# Patient Record
Sex: FEMALE | Race: WHITE | HISPANIC OR LATINO | Employment: PART TIME | URBAN - METROPOLITAN AREA
[De-identification: names, ages, dates, MRNs, and addresses within clinical notes are randomized per-mention and may not be internally consistent; named-entity substitution may affect disease eponyms.]

---

## 2017-02-06 PROBLEM — E55.9 VITAMIN D DEFICIENCY DISEASE: Status: ACTIVE | Noted: 2017-02-06

## 2017-02-20 PROBLEM — M79.18 MYOFACIAL MUSCLE PAIN: Status: ACTIVE | Noted: 2017-02-20

## 2017-03-31 PROBLEM — D64.9 ANEMIA: Status: ACTIVE | Noted: 2017-03-31

## 2017-03-31 PROBLEM — E83.51 HYPOCALCEMIA: Status: ACTIVE | Noted: 2017-03-31

## 2017-08-30 PROBLEM — E53.8 VITAMIN B 12 DEFICIENCY: Status: ACTIVE | Noted: 2017-08-30

## 2017-08-30 PROBLEM — I27.20 PULMONARY HYPERTENSION (HCC): Status: ACTIVE | Noted: 2017-08-30

## 2017-08-30 PROBLEM — G47.33 OSA (OBSTRUCTIVE SLEEP APNEA): Status: ACTIVE | Noted: 2017-08-30

## 2017-08-30 PROBLEM — D64.9 ANEMIA: Status: RESOLVED | Noted: 2017-03-31 | Resolved: 2017-08-30

## 2017-08-30 PROBLEM — Z79.01 ANTICOAGULATED: Status: ACTIVE | Noted: 2017-08-30

## 2017-10-20 PROBLEM — E27.49 ADRENAL HEMORRHAGE (HCC): Status: ACTIVE | Noted: 2017-10-20

## 2017-12-12 PROBLEM — Z86.711 HISTORY OF PULMONARY EMBOLISM: Status: ACTIVE | Noted: 2017-12-12

## 2018-02-27 PROBLEM — F11.90 CHRONIC NARCOTIC USE: Status: ACTIVE | Noted: 2018-02-27

## 2018-02-27 PROBLEM — R32 URINARY INCONTINENCE: Status: ACTIVE | Noted: 2018-02-27

## 2018-03-05 PROBLEM — Z91.148 CONTROLLED SUBSTANCE AGREEMENT BROKEN: Status: ACTIVE | Noted: 2018-03-05

## 2018-06-28 PROBLEM — N39.41 URGE INCONTINENCE OF URINE: Status: ACTIVE | Noted: 2018-06-28

## 2018-06-28 PROBLEM — R32 URINARY INCONTINENCE: Status: RESOLVED | Noted: 2018-02-27 | Resolved: 2018-06-28

## 2018-07-24 PROBLEM — D50.8 IRON DEFICIENCY ANEMIA SECONDARY TO INADEQUATE DIETARY IRON INTAKE: Status: ACTIVE | Noted: 2018-07-24

## 2018-09-11 PROBLEM — F33.1 MODERATE EPISODE OF RECURRENT MAJOR DEPRESSIVE DISORDER (HCC): Status: ACTIVE | Noted: 2018-09-11

## 2019-04-17 PROBLEM — D50.8 IRON DEFICIENCY ANEMIA SECONDARY TO INADEQUATE DIETARY IRON INTAKE: Status: RESOLVED | Noted: 2018-07-24 | Resolved: 2019-04-17

## 2019-07-16 PROBLEM — N39.41 URGENCY INCONTINENCE: Status: ACTIVE | Noted: 2019-07-16

## 2019-07-16 PROBLEM — R35.0 URINARY FREQUENCY: Status: ACTIVE | Noted: 2019-07-16

## 2020-02-12 PROBLEM — Z87.440 HISTORY OF RECURRENT UTIS: Status: ACTIVE | Noted: 2020-02-12

## 2020-06-04 PROBLEM — K59.00 CONSTIPATION: Status: ACTIVE | Noted: 2020-06-04

## 2020-06-04 PROBLEM — R10.13 EPIGASTRIC ABDOMINAL PAIN: Status: ACTIVE | Noted: 2020-06-04

## 2020-10-14 PROBLEM — Z79.01 ANTICOAGULATED: Status: RESOLVED | Noted: 2017-08-30 | Resolved: 2020-10-14

## 2020-10-14 PROBLEM — Z86.711 HISTORY OF PULMONARY EMBOLISM: Status: RESOLVED | Noted: 2017-12-12 | Resolved: 2020-10-14

## 2020-11-02 PROBLEM — R30.0 DYSURIA: Status: ACTIVE | Noted: 2020-11-02

## 2021-12-13 PROBLEM — K43.9 VENTRAL HERNIA WITHOUT OBSTRUCTION OR GANGRENE: Status: ACTIVE | Noted: 2021-12-13

## 2021-12-13 PROBLEM — J38.5 LARYNGOSPASM: Status: ACTIVE | Noted: 2021-12-13

## 2021-12-13 PROBLEM — E87.6 HYPOKALEMIA: Status: ACTIVE | Noted: 2021-12-13

## 2021-12-13 PROBLEM — J81.0 ACUTE PULMONARY EDEMA (HCC): Status: ACTIVE | Noted: 2021-12-13

## 2021-12-13 PROBLEM — R74.01 TRANSAMINITIS: Status: ACTIVE | Noted: 2021-12-13

## 2023-12-28 PROBLEM — R30.0 DYSURIA: Status: RESOLVED | Noted: 2020-11-02 | Resolved: 2023-12-28

## 2024-03-22 ENCOUNTER — APPOINTMENT (OUTPATIENT)
Dept: GYNECOLOGY | Facility: CLINIC | Age: 69
End: 2024-03-22

## 2024-06-10 ENCOUNTER — APPOINTMENT (OUTPATIENT)
Dept: GYNECOLOGY | Facility: CLINIC | Age: 69
End: 2024-06-10

## 2024-06-10 VITALS
DIASTOLIC BLOOD PRESSURE: 70 MMHG | BODY MASS INDEX: 39.85 KG/M2 | HEART RATE: 89 BPM | WEIGHT: 203 LBS | SYSTOLIC BLOOD PRESSURE: 114 MMHG | HEIGHT: 60 IN

## 2024-06-10 DIAGNOSIS — N95.8 GENITOURINARY SYNDROME OF MENOPAUSE: ICD-10-CM

## 2024-06-10 DIAGNOSIS — N39.46 URINARY INCONTINENCE, MIXED: Primary | ICD-10-CM

## 2024-06-10 DIAGNOSIS — R35.1 NOCTURIA: ICD-10-CM

## 2024-06-10 DIAGNOSIS — N39.41 URGE INCONTINENCE: ICD-10-CM

## 2024-06-10 DIAGNOSIS — N39.0 RECURRENT URINARY TRACT INFECTION: ICD-10-CM

## 2024-06-10 DIAGNOSIS — G47.33 OSA (OBSTRUCTIVE SLEEP APNEA): ICD-10-CM

## 2024-06-10 LAB
APPEARANCE UR: NORMAL
BILIRUB UR STRIP-MCNC: NEGATIVE MG/DL
COLOR UR AUTO: NORMAL
GLUCOSE UR STRIP.AUTO-MCNC: NEGATIVE MG/DL
KETONES UR STRIP.AUTO-MCNC: NORMAL MG/DL
LEUKOCYTE ESTERASE UR QL STRIP.AUTO: NORMAL
NITRITE UR QL STRIP.AUTO: NEGATIVE
PH UR STRIP.AUTO: 6 [PH] (ref 5–8)
PROT UR QL STRIP: NORMAL MG/DL
RBC UR QL AUTO: NORMAL
SP GR UR STRIP.AUTO: >=1.03
UROBILINOGEN UR STRIP-MCNC: NORMAL MG/DL

## 2024-06-10 PROCEDURE — 99204 OFFICE O/P NEW MOD 45 MIN: CPT | Performed by: STUDENT IN AN ORGANIZED HEALTH CARE EDUCATION/TRAINING PROGRAM

## 2024-06-10 PROCEDURE — 3074F SYST BP LT 130 MM HG: CPT | Performed by: STUDENT IN AN ORGANIZED HEALTH CARE EDUCATION/TRAINING PROGRAM

## 2024-06-10 PROCEDURE — 3078F DIAST BP <80 MM HG: CPT | Performed by: STUDENT IN AN ORGANIZED HEALTH CARE EDUCATION/TRAINING PROGRAM

## 2024-06-10 PROCEDURE — 81002 URINALYSIS NONAUTO W/O SCOPE: CPT | Performed by: STUDENT IN AN ORGANIZED HEALTH CARE EDUCATION/TRAINING PROGRAM

## 2024-06-10 RX ORDER — ESTRADIOL 0.1 MG/G
CREAM VAGINAL
Qty: 1 EACH | Refills: 6 | Status: SHIPPED | OUTPATIENT
Start: 2024-06-10

## 2024-06-10 ASSESSMENT — FIBROSIS 4 INDEX: FIB4 SCORE: 1.41

## 2024-06-10 NOTE — PROGRESS NOTES
Urogynecology & Reconstructive Pelvic Surgery - Consultation Visit    Mariann He MRN:[id number] :1955    Referred by: Teri Henriquez MD ([facility])    Reason for Visit:   Chief Complaint   Patient presents with    New Patient     Consult          Subjective     History of Presenting Illness:    Mariann He is a 69 y.o. who presents for the evaluation and management of urinary incontinence and recurrent UTIs.     She has significant urge predominant urinary incontinence, leaking on the way to the bathroom, and also waking every hour, and has already tried trospium, oxybutynin, Myrbetriq, Gemtesa.  The latter 2 were too expensive even through [place] pharmacies.  She was referred to discuss possible third line options including neuromodulation    She has had frequent UTIs recently, 3 in the last few months.  She has not yet tried vaginal estrogen    Her past history is notable for BMI 39, significant back issues, and walks with a cane.  She reports that she is not a candidate for any surgery    Prior Pelvic surgery:   Hysterectomy  Cholecystectomy  Ventral hernia repair with mesh     Prior treatment:   Trospium - current  Oxybutynin  Mirabegron  Vibegron.     Pelvic floor symptom review:     Bladder:   Voids per day: 10 Voids per night: 6      Urinary incontinence episodes per day: 1    Urge leakage:  On Movement to Bathroom and Full Bladder   Stress leakage: occasionally with exercise   Continuous / insensible urine loss: No    Nocturnal enuresis: No    Leakage volume: Moderate to large soakin gepisodes   Bladder emptying: Complete   Voiding symptoms: None   UTI in last 12 months: yes   Other urologic history: none      Prolapse:     Prolapse symptoms: None        Past medical and surgical history      Past medical history:  Past Medical History:   Diagnosis Date    Laryngospasm 2021    Cataract 2021    surgery both eyes    Moderate episode of recurrent major depressive disorder (HCC)  09/11/2018    Iron deficiency anemia secondary to inadequate dietary iron intake 07/24/2018    Iron deficiency anemia secondary to inadequate dietary iron intake 07/24/2018    Urinary incontinence 02/27/2018    Chronic narcotic use RX BFM OP 2/18 02/27/2018    History of pulmonary embolism 12/12/2017    Vitamin B 12 deficiency 08/30/2017    Anticoagulated 08/30/2017    Pulmonary hypertension (HCC) 08/30/2017    FLORENTIN (obstructive sleep apnea) 08/30/2017    Anticoagulated 3.0-3.5 08/30/2017    Hypocalcemia 03/31/2017    Anemia 03/31/2017    Myofacial muscle pain 02/20/2017    Vitamin D deficiency disease 02/06/2017    Chronic back pain 06/03/2016    Lupus anticoagulant positive 02/20/2016    Anxiety 02/12/2016    PE (pulmonary thromboembolism) ([facility]) 02/12/2016    Bronchitis 2016    Pneumonia 2016    Colonoscopy refused 12/16/2015    Leucopenia 10/26/2015    WBC 3.1 (10/26/2015)    Dyslipidemia 10/26/2015    , TC/HDL 2.10 (10/26/2015)    Hypertriglyceridemia 10/26/2015     (10/26/2015)    Hypovitaminosis D 10/26/2015    vit D 15 (12/14/2015)    COPD exacerbation ([facility]) 10/06/2015    Cough 10/06/2015    Morbid obesity with BMI of 50.0-59.9, adult ([facility]) 02/09/2015    Elevated alkaline phosphatase level 07/2014    alk phos 117 (10/26/2015)    Hypoalbuminemia 07/2014    serum alb 3.2 (10/26/2015)    H/O: pneumonia 12/2012    Allergy     Anesthesia     PONV    Antiphospholipid antibody syndrome ([facility])     Arthritis     back    Asthma     Bladder spasm     Bowel habit changes     chronic constipation    Diabetes ([facility])     prediabetes per pt    Esophageal reflux     Fibromyalgia     Former tobacco use     quit 1974    H/O blood clots     Headache     Headache(784.0)     Heart burn     Hot flashes     Hypocalcemia     Calcium 8.4 (8.5-10.1) 10/26/2015    Hypothyroidism     TSH 0.67 (12/14/2015)    IBD (inflammatory bowel disease)     Indigestion     Myalgia     Neuropathy ([facility])     PONV (postoperative nausea and  "vomiting)     Renal disorder     kidney stones \"a long time ago\"    Stress incontinence     Ulcer     Urinary tract infection     Urinary urgency      Past surgical history:  Past Surgical History:   Procedure Laterality Date    VENTRAL HERNIA REPAIR ROBOTIC XI N/A 12/13/2021    Procedure: Robotic,ventral hernia repair x2 with mesh, reduction of incarcerated bladder from periumbilical ventral hernia.;  Surgeon: Trinidad Light M.D.;  Location: [facility];  Service: General    MO COLONOSCOPY,BIOPSY  9/30/2020    Procedure: COLONOSCOPY, WITH BIOPSY;  Surgeon: David Starks M.D.;  Location: [facility];  Service: Gastroenterology    MO UPPER GI ENDOSCOPY,BIOPSY  9/30/2020    Procedure: GASTROSCOPY, WITH BIOPSY;  Surgeon: David Starks M.D.;  Location: [facility];  Service: Gastroenterology    MO CYSTOURETHROSCOPY,BIOPSIES  6/8/2020    Procedure: CYSTOSCOPY, WITH BLADDER BIOPSY [57.33];  Surgeon: Fausto Buchanan M.D.;  Location: SUR[facility]ERY [facility];  Service: Urology    BLOCK EPIDURAL STEROID INJECTION  6/20/2017    Procedure: LUMBAR EPIDURAL STEROID INJECTION UNDER FLUOROSCOPY WITH PROCEDURAL SEDATION;  Surgeon: Kevin Booth M.D.;  Location: SUR[facility]ERY [facility];  Service:     APPENDECTOMY  1997    HYSTERECTOMY, TOTAL ABDOMINAL  1997    ABDOMINAL HYSTERECTOMY TOTAL      CHOLECYSTECTOMY      Open    GASTRIC BYPASS LAPAROSCOPIC      HERNIA REPAIR       Medications:has a current medication list which includes the following prescription(s): estradiol, linaclotide, warfarin, hydrocodone-acetaminophen, levothyroxine, cyanocobalamin, vitamin d2 (ergocalciferol), trospium, omeprazole, gabapentin, NON SPECIFIED, naloxone, docusate sodium, and albuterol, and the following Facility-Administered Medications: cyanocobalamin.  Allergies:Codeine, Dilaudid [hydromorphone hcl], Pcn [penicillins], Sulfa drugs, Asa [aspirin], Ciprofloxacin, Ibuprofen, and Vesicare [solifenacin]  Family history:  Family " History   Problem Relation Age of Onset    Heart Disease Mother     Other Mother         Kidney Disease    Heart Attack Father 74    Diabetes Father     Hypertension Father     Stroke Father 73    Other Sister         unknown causes    Diabetes Sister     Heart Disease Brother         Heart bypass x 4    Diabetes Brother     Diabetes Brother     Cancer Brother 69        Rectal     Social history: reports that she quit smoking about 60 years ago. Her smoking use included cigarettes. She has never used smokeless tobacco. She reports current alcohol use. She reports current drug use. Drug: Oral.    Review of systems: A full review of systems was performed, and negative with the exception of want is noted above in the HPI.        Objective        /70 (BP Location: Left arm, Patient Position: Sitting, BP Cuff Size: Large adult)   Pulse 89   Ht 5'   Wt 203 lb   BMI 39.65 kg/m²     Physical Exam  Vitals reviewed. Exam conducted with a chaperone present.   Constitutional:       Appearance: Normal appearance. She is obese.   HENT:      Head: Normocephalic.      Mouth/Throat:      Mouth: Mucous membranes are moist.   Cardiovascular:      Rate and Rhythm: Normal rate.   Pulmonary:      Effort: Pulmonary effort is normal.   Musculoskeletal:      Comments: Uses cane   Skin:     General: Skin is warm and dry.   Neurological:      Mental Status: She is alert.   Psychiatric:         Mood and Affect: Mood normal.         Procedure Performed: No    Diagnostic test and records review:      Post-void residual: 0 mL, performed by Bladder Scanner    Labs: n/a    Radiology: n/a    Procedural: n/a    Documentation reviewed: Prior EMR Records         Assessment & Plan     Mariann He is a 69 y.o. with urge predominant mixed urinary incontinence. We discussed my recommendations for further diagnosis and treatment at length today.     1. Urinary incontinence, mixed, urge predominant with nocturia  She has overactive bladder and  urgency incontinence which has not responded significantly to behavioral therapy, or to oral medications including multiple anticholinergics and beta 3 agonists. She qualifies for third line therapies, and she was counseled on her options, which include:   Percutaneous tibial nerve stimulation (PTNS) - noninvasive and minimal risk therapy which involves nerve stimulation using an acupuncture-type needle in the ankle with non-painful nerve stimulation. This involves 12 weekly 30-minute treatment sessions, then maintenance treatments as needed, usually every 1-2 months. Symptom improvement is seen in 60-80% of patients.  This is not feasible given how far she lives with in the clinic, and given her large leakage episodes would be less likely to her symptoms   sacral neuromodulation (SNM) - this involves a “test phase” with temporary lead implantation, followed by a full permanent implant of lad and batter if therapy results in >50% improvement in symptoms. Therapy is successful in reducing OAB symptoms in approximately 70% of patients.  While this could be an excellent therapy for her given her significant back problems, this may limit its effectiveness.  Neuromodulation has been shown to be effective in those with significant back pain, although I have found this often limits success rates.  Bladder chemodenervation with Botox injection - this is an office/outpatient procedure involving a cystoscopy (camera in bladder) and injection of onabotulinumtoxinA. Botox has higher rates of complete symptom resolution compared to SNM (70-80%), but may result in temporary urinary retention requiring intermittent catheterization (6-12%). The effects of botox usually last between 6-12 months, and if successful, repeat injections are necessary.   She would like to move forward with a trial of cystourethroscopy and chemodenervation with onabotulinum toxin A, 100 units  Referral to Physical Therapy also placed ([facility])  She  was counseled thoroughly on the link between obstructive sleep apnea and nocturia.  Given that she has nocturnal frequency up to 6 times per night, it is more likely that her FLORENTIN is the cause of her nighttime waking episodes then only the bladder.  She is already getting oxygen treatment, since she cannot tolerate CPAP.  I recommend trying to optimize her FLORENTIN in order to decrease her nighttime waking episodes and also for her overall health.    2. Recurrent urinary tract infection  She is counseled on the etiology of recurrent urinary tract infection.  In women, most uropathogens originate in the rectal jeni, colonize the periurethral area and urethra, and ascend to the bladder. Alteration of the normal vaginal jeni may predispose women to vaginal colonization with bacteria and to UTI. Risk factors include menopause, urinary incontinence, sexual intercourse, and prior history of UTIs.   Counseled that bacteria in the urine without symptoms does not require treatment - conversely symptoms without bacteria on culture do not require antibiotics and further workup is required.  Overuse of antibiotics when not necessary may contribute to harming natural defense mechanisms within the body.  The importance of obtaining urine cultures with each bout of symptoms was emphasized.  Due to living so far from our offices, she will need to follow these up with her primary care and report back if she has any worsening of her symptoms.  She will be unable to go to [facility]  Vaginal estrogen replacement in postmenopausal women is one of the most effective and safest therapies in preventing recurrent urinary tract infection.  This works by helping improve periurethral tissue quality and optimizing the vaginal microbiome to prevent colonization by harmful gut jeni.  Estrogen therapy is safe, not significantly absorbed by the bloodstream, and may take weeks to months to start to see effects  If no improvement with the above  therapy, I would recommend cystourethroscopy to fully evaluate the bladder urothelium to rule out structural causes for her symptoms.    3. Genitourinary syndrome of menopause  She has vaginal atrophy / genitorurinary syndrome of menopause. This is very common and due to low estrogen levels, which render the vaginal tissue thin, irritated, and open to colonization with gut jeni. This can lead to irritation, dryness, painful sex, urinary infections and urinary urgency and incontinence. Discussed risks, benefits, and indications for vaginal estrogen therapy.  Vaginal estrogen is considered a topical-only therapy that has negligible absorption into the bloodstream and is not associated with increased risks for uterine or breast cancers, stroke, blood clots. The effects of vaginal estrogen can take weeks to months.  Prescription given for:   - estradiol (ESTRACE VAGINAL) 0.1 MG/GM vaginal cream; Apply 1g cream inside vagina using applicator nightly for 2 weeks, then twice per week thereafter. For refills, continue to take twice per week.  Dispense: 1 Each; Refill: 6      Talk to PCP about FLORENTIN            Tal Arita MD, FACOG  Urogynecology and Reconstructive Pelvic Surgery  Department of Obstetrics and Gynecology  [facility]        This medical record contains text that has been entered with the assistance of computer voice recognition and dictation software.  Therefore, it may contain unintended errors in text, spelling, punctuation, or grammar

## 2024-06-10 NOTE — PROGRESS NOTES
PT here today for consult   Ref for Urge Incontinence   Hysterectomy? 1997  Good #:  [phone]   PVR : 0 mL  Pharmacy Verified

## 2024-07-25 ENCOUNTER — TELEPHONE (OUTPATIENT)
Dept: OBGYN | Facility: CLINIC | Age: 69
End: 2024-07-25
Payer: MEDICARE

## 2024-07-26 ENCOUNTER — TELEPHONE (OUTPATIENT)
Dept: OBGYN | Facility: CLINIC | Age: 69
End: 2024-07-26

## 2024-07-26 ENCOUNTER — APPOINTMENT (OUTPATIENT)
Dept: GYNECOLOGY | Facility: CLINIC | Age: 69
End: 2024-07-26
Payer: MEDICARE

## 2024-08-20 PROBLEM — R39.89 BLADDER PAIN: Status: ACTIVE | Noted: 2024-08-20

## 2024-08-20 PROBLEM — N32.81 OVERACTIVE BLADDER: Status: ACTIVE | Noted: 2024-08-20

## 2024-08-20 PROBLEM — N30.10 INTERSTITIAL CYSTITIS: Status: ACTIVE | Noted: 2024-08-20

## 2024-12-04 PROBLEM — M54.30 CHRONIC SCIATICA: Status: ACTIVE | Noted: 2024-12-04

## 2024-12-07 ENCOUNTER — TELEPHONE (OUTPATIENT)
Dept: OBGYN | Facility: CLINIC | Age: 69
End: 2024-12-07
Payer: MEDICARE